# Patient Record
Sex: FEMALE | Race: WHITE | NOT HISPANIC OR LATINO | Employment: OTHER | URBAN - METROPOLITAN AREA
[De-identification: names, ages, dates, MRNs, and addresses within clinical notes are randomized per-mention and may not be internally consistent; named-entity substitution may affect disease eponyms.]

---

## 2024-02-05 ENCOUNTER — OFFICE VISIT (OUTPATIENT)
Dept: GASTROENTEROLOGY | Facility: CLINIC | Age: 64
End: 2024-02-05
Payer: MEDICARE

## 2024-02-05 ENCOUNTER — TELEPHONE (OUTPATIENT)
Dept: GASTROENTEROLOGY | Facility: CLINIC | Age: 64
End: 2024-02-05

## 2024-02-05 VITALS
WEIGHT: 186 LBS | SYSTOLIC BLOOD PRESSURE: 120 MMHG | BODY MASS INDEX: 35.12 KG/M2 | DIASTOLIC BLOOD PRESSURE: 80 MMHG | HEIGHT: 61 IN

## 2024-02-05 DIAGNOSIS — K42.9 PERIUMBILICAL HERNIA: ICD-10-CM

## 2024-02-05 DIAGNOSIS — Z12.11 COLON CANCER SCREENING: ICD-10-CM

## 2024-02-05 DIAGNOSIS — R19.4 CHANGE IN BOWEL HABIT: Primary | ICD-10-CM

## 2024-02-05 PROCEDURE — 99204 OFFICE O/P NEW MOD 45 MIN: CPT | Performed by: STUDENT IN AN ORGANIZED HEALTH CARE EDUCATION/TRAINING PROGRAM

## 2024-02-05 RX ORDER — POLYETHYLENE GLYCOL 3350 17 G/17G
238 POWDER, FOR SOLUTION ORAL ONCE
Qty: 238 G | Refills: 0 | Status: SHIPPED | OUTPATIENT
Start: 2024-02-05 | End: 2024-02-07 | Stop reason: HOSPADM

## 2024-02-05 RX ORDER — MULTIVITAMIN
1 CAPSULE ORAL DAILY
COMMUNITY

## 2024-02-05 NOTE — PROGRESS NOTES
Consultation - [facility]     Yulissa Sunshine 64 y.o. female MRN: [id number]  Unit/Bed#:  Encounter: [id number]    Consults    ASSESSMENT and PLAN    1. Change in bowel habit  She has new onset constipation which is improved somewhat with MiraLAX and senna.  She was advised to take MiraLAX every day, 1-2 times daily and take senna as needed.  Will plan for colonoscopy for acute change in bowel patterns and since her last was 5 years ago and she was advised to repeat around this time anyway.  Can assess for any structural/mucosal issues at that time.  Possible IBS component.  - Colonoscopy; Future  - polyethylene glycol (MiraLax) 17 GM/SCOOP powder; Take 238 g by mouth once for 1 dose Take 238 g my mouth. Use as directed  Dispense: 238 g; Refill: 0    2. Colon cancer screening  As noted above, she is due for colonoscopy this year so we will schedule at this time, MiraLAX prep, explained risk/benefits/alternatives, need for split dose bowel prep, need for clear liquid diet, ride to and from procedure.  - Colonoscopy; Future  - polyethylene glycol (MiraLax) 17 GM/SCOOP powder; Take 238 g by mouth once for 1 dose Take 238 g my mouth. Use as directed  Dispense: 238 g; Refill: 0    3. Periumbilical hernia  Incidental finding of Taylor's hernia on imaging.  She was given surgical referral by her gyn-onc, but is asking if we know anybody up here.  I advised we can refer to general surgery but she is wondering if she should see somebody with oncology specialty.  I advise she can discuss with her gyn-onc and see what they recommend in this regard.      Chief Complaint   Patient presents with    CT showing hernia        Physician Requesting Consult: No att. providers found    HPI  Yulissa Sunshine is a 64 y.o. 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  At baseline she moves her bowels once daily but for the last 1 to 2 months she has had worsening constipation, moving her bowels every few days and has associated bloating, indigestion and stomach \"rawness\".  She does have times where she goes to use the bathroom and nothing but air comes out and when stool does come out it is mushy/grainy.    She does not take prescription medications on a regular basis.  She did not receive radiation therapy for her cancer.  Her last colonoscopy was roughly 5 years ago and was advised to have a repeat in 5 years.    She did recently start MiraLAX and senna daily for about 3 days and had a \"decent \"bowel movement yesterday.  She did take half bottle of magnesium citrate sometime last week which improved her bowels and took away her bloating.    She had a recent CT at [place] which showed fecal contents in the colon, 2 images were attached to the chart and reviewed, no significant colonic distention.  She was also told she had a small Taylor's hernia which does cause her little bit of discomfort and was given surgery referral by her gyn-onc.      GI History:  Prior Colonoscopy: No prior colonoscopy  Prior EGD:  No prior EGD  Family hx:  No reported first degree relatives with colorectal cancer  Surgical hx: No prior abdominal surgeries  Blood thinners: Denies antiplatelet or anticoagulation use  NSAID use: Denies regular NSAID use  DM meds: None  Social Hx: No regular ETOH, smoking, or drug use.          Historical Information   No past medical history on file.  No past surgical history on file.  Social History   Social History     Substance and Sexual Activity   Alcohol Use Not on file     Social History     Substance and Sexual Activity   Drug Use Not on file     Social History     Tobacco Use   Smoking Status Not on file   Smokeless Tobacco Not on file     No family history on file.    Meds/Allergies     No current facility-administered medications for this visit.     (Not " "in a hospital admission)      Allergies   Allergen Reactions    Percocet [Oxycodone-Acetaminophen] Other (See Comments)     Disorientation and lightheaded       PHYSICAL EXAM    Visit Vitals  /80 (BP Location: Left arm, Patient Position: Sitting)   Ht 5' 1\" (1.549 m)   Wt 84.4 kg (186 lb)   BMI 35.14 kg/m²   BSA 1.83 m²     Body mass index is 35.14 kg/m².  General Appearance: NAD, cooperative, alert  Eyes: Anicteric, EOMI  ENT: Normocephalic, atraumatic, normal mucosa  Neck: symmetrical, trachea midline  Lungs: clear to auscultation, non-labored respirations on room air, no wheezing  CV: S1 S2+ radial pulses bilaterally  GI:  Soft, non-tender, non-distended; normal bowel sounds; no masses, no organomegaly   Rectal: Deferred  Musculoskeletal: No gross deformities or pitting edema  Skin:  No jaundice, no rashes  Neurologic: awake, alert, oriented, no gross deficits    No results found for: \"GLUCOSE\", \"CALCIUM\", \"NA\", \"K\", \"CO2\", \"CL\", \"BUN\", \"CREATININE\"  No results found for: \"WBC\", \"HGB\", \"MCV\", \"PLT\"  No results found for: \"ALT\", \"AST\", \"GGT\", \"ALKPHOS\", \"TBILI\"  No results found for: \"AMYLASE\"  No results found for: \"LIPASE\"  No results found for: \"IRON\", \"TIBC\", \"FERRITIN\"  No results found for: \"INR\"    CTA abdomen pelvis w wo contrast    Result Date: 1/29/2024  Narrative: STUDY: ENHANCED CT OF THE ABDOMEN AND PELVIS CLINICAL INFORMATION:   History: Patient with history of 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 PROCEDURE: Enhanced CT examination of the abdomen and pelvis was performed after administration of intravenous contrast.   Intravenous contrast: 80 mL of IOPAMIDOL 76 % IV SOLN   Enteric contrast: None. COMPARISON: CT abdomen/pelvis dated 11/8/2021. FINDINGS: LOWER THORAX: A separate chest CT has been performed and will be independently reported. LIVER: Normal in " size and configuration. No suspicious mass. Subcentimeter hypodensities too small to characterize but statistically benign. The portal veins are patent.      BILE DUCTS: No intrahepatic or extrahepatic bile duct dilation. GALLBLADDER: No calcified stones. Normal wall thickness. PANCREAS: Unremarkable. No main pancreatic duct dilation. SPLEEN: Unremarkable. Splenule. ADRENAL GLANDS: Unremarkable. KIDNEYS/URETERS: Persistent fetal lobulation of the kidneys. No hydroureteronephrosis. No suspicious renal mass. 3 mm nonobstructing right upper pole renal calculus, similar to prior. Unchanged subcentimeter left interpolar cyst measuring slightly above water attenuation (series 13, image 74). Bilateral simple renal cysts. Additional hypodensities are too small to characterize but statistically benign. BLADDER: Normally distended.  REPRODUCTIVE ORGANS: Hysterectomy. No adnexal mass. BOWEL: Small hiatal hernia. No disproportionate dilation of the small or large bowel. The appendix is unremarkable. There are scattered colonic diverticula without findings of diverticulitis. Large colonic stool burden in keeping with hypomotility. Debris within the distal small bowel which may be on the basis of delayed transit as opposed to an incompetent ileocecal valve. PERITONEUM/RETROPERITONEUM: No fluid collection, ascites, or pneumoperitoneum. LYMPH NODES: No abdominal or pelvic lymphadenopathy. VESSELS: Atherosclerotic disease without aortic aneurysm [ABAN00]. ABDOMINAL/PELVIC WALL: Supraumbilical ventral Taylor's hernia containing nonobstructed small bowel loops. Postsurgical changes of anterior abdominal wall. BONES: No suspicious osseous lesions.  Degenerative changes of the spine. Grade 1 anterolisthesis of L4 and L5, similar to prior. Scattered pelvic bone islands. START INTERVAL TUMOR RESPONSE ASSESSMENT Pre-existing lesions: No previously documented tumor(s). [ORAP00] New lesions: No new tumor identified since prior study.  [ORAN1] END INTERVAL TUMOR RESPONSE ASSESSMENT    Impression: 1.  No acute intra-abdominal pathology or findings in the bowel to account for patient symptomatology. 2.  No evidence of locally recurrent or metastatic disease in the abdomen or pelvis. ATTENDING PHYSICIAN AGREEMENT [[id number]]: I have personally reviewed the images and agree with this report.    CT chest w contrast    Result Date: 1/27/2024  Narrative: CLINICAL INFORMATION:  Ovarian cancer PROCEDURE: Contrast enhanced CT of the chest was performed using thin section reconstructions. INTRAVENOUS CONTRAST: 80ml of IOPAMIDOL 76 % IV SOLN. COMPARISON: 11/21 FINDINGS: LUNGS, PLEURA: Curvilinear bibasilar scarring. 4 mm right middle lobe nodule, unchanged CARDIOVASCULAR, MEDIASTINUM, THYROID: Small hiatal hernia. Coronary calcifications. LYMPH NODES: No pathological adenopathy SKELETON, CHEST WALL: No destructive bone lesion  UPPER ABDOMEN: Reported separately    Impression: No new or growing nodules      Imaging Studies: I have personally reviewed pertinent reports.      Pathology, and Other Studies: I have personally reviewed pertinent reports.      REVIEW OF SYSTEMS    CONSTITUTIONAL: negative unless stated in HPI  GASTROINTESTINAL: As noted in the HPI

## 2024-02-05 NOTE — H&P (VIEW-ONLY)
Consultation - [facility]     Yulissa Sunshine 64 y.o. female MRN: [id number]  Unit/Bed#:  Encounter: [id number]    Consults    ASSESSMENT and PLAN    1. Change in bowel habit  She has new onset constipation which is improved somewhat with MiraLAX and senna.  She was advised to take MiraLAX every day, 1-2 times daily and take senna as needed.  Will plan for colonoscopy for acute change in bowel patterns and since her last was 5 years ago and she was advised to repeat around this time anyway.  Can assess for any structural/mucosal issues at that time.  Possible IBS component.  - Colonoscopy; Future  - polyethylene glycol (MiraLax) 17 GM/SCOOP powder; Take 238 g by mouth once for 1 dose Take 238 g my mouth. Use as directed  Dispense: 238 g; Refill: 0    2. Colon cancer screening  As noted above, she is due for colonoscopy this year so we will schedule at this time, MiraLAX prep, explained risk/benefits/alternatives, need for split dose bowel prep, need for clear liquid diet, ride to and from procedure.  - Colonoscopy; Future  - polyethylene glycol (MiraLax) 17 GM/SCOOP powder; Take 238 g by mouth once for 1 dose Take 238 g my mouth. Use as directed  Dispense: 238 g; Refill: 0    3. Periumbilical hernia  Incidental finding of Taylor's hernia on imaging.  She was given surgical referral by her gyn-onc, but is asking if we know anybody up here.  I advised we can refer to general surgery but she is wondering if she should see somebody with oncology specialty.  I advise she can discuss with her gyn-onc and see what they recommend in this regard.      Chief Complaint   Patient presents with    CT showing hernia        Physician Requesting Consult: No att. providers found    HPI  Yulissa Sunshine is a 64 y.o. 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  At baseline she moves her bowels once daily but for the last 1 to 2 months she has had worsening constipation, moving her bowels every few days and has associated bloating, indigestion and stomach \"rawness\".  She does have times where she goes to use the bathroom and nothing but air comes out and when stool does come out it is mushy/grainy.    She does not take prescription medications on a regular basis.  She did not receive radiation therapy for her cancer.  Her last colonoscopy was roughly 5 years ago and was advised to have a repeat in 5 years.    She did recently start MiraLAX and senna daily for about 3 days and had a \"decent \"bowel movement yesterday.  She did take half bottle of magnesium citrate sometime last week which improved her bowels and took away her bloating.    She had a recent CT at [place] which showed fecal contents in the colon, 2 images were attached to the chart and reviewed, no significant colonic distention.  She was also told she had a small Taylor's hernia which does cause her little bit of discomfort and was given surgery referral by her gyn-onc.      GI History:  Prior Colonoscopy: No prior colonoscopy  Prior EGD:  No prior EGD  Family hx:  No reported first degree relatives with colorectal cancer  Surgical hx: No prior abdominal surgeries  Blood thinners: Denies antiplatelet or anticoagulation use  NSAID use: Denies regular NSAID use  DM meds: None  Social Hx: No regular ETOH, smoking, or drug use.          Historical Information   No past medical history on file.  No past surgical history on file.  Social History   Social History     Substance and Sexual Activity   Alcohol Use Not on file     Social History     Substance and Sexual Activity   Drug Use Not on file     Social History     Tobacco Use   Smoking Status Not on file   Smokeless Tobacco Not on file     No family history on file.    Meds/Allergies     No current facility-administered medications for this visit.     (Not " "in a hospital admission)      Allergies   Allergen Reactions    Percocet [Oxycodone-Acetaminophen] Other (See Comments)     Disorientation and lightheaded       PHYSICAL EXAM    Visit Vitals  /80 (BP Location: Left arm, Patient Position: Sitting)   Ht 5' 1\" (1.549 m)   Wt 84.4 kg (186 lb)   BMI 35.14 kg/m²   BSA 1.83 m²     Body mass index is 35.14 kg/m².  General Appearance: NAD, cooperative, alert  Eyes: Anicteric, EOMI  ENT: Normocephalic, atraumatic, normal mucosa  Neck: symmetrical, trachea midline  Lungs: clear to auscultation, non-labored respirations on room air, no wheezing  CV: S1 S2+ radial pulses bilaterally  GI:  Soft, non-tender, non-distended; normal bowel sounds; no masses, no organomegaly   Rectal: Deferred  Musculoskeletal: No gross deformities or pitting edema  Skin:  No jaundice, no rashes  Neurologic: awake, alert, oriented, no gross deficits    No results found for: \"GLUCOSE\", \"CALCIUM\", \"NA\", \"K\", \"CO2\", \"CL\", \"BUN\", \"CREATININE\"  No results found for: \"WBC\", \"HGB\", \"MCV\", \"PLT\"  No results found for: \"ALT\", \"AST\", \"GGT\", \"ALKPHOS\", \"TBILI\"  No results found for: \"AMYLASE\"  No results found for: \"LIPASE\"  No results found for: \"IRON\", \"TIBC\", \"FERRITIN\"  No results found for: \"INR\"    CTA abdomen pelvis w wo contrast    Result Date: 1/29/2024  Narrative: STUDY: ENHANCED CT OF THE ABDOMEN AND PELVIS CLINICAL INFORMATION:   History: Patient with history of 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 PROCEDURE: Enhanced CT examination of the abdomen and pelvis was performed after administration of intravenous contrast.   Intravenous contrast: 80 mL of IOPAMIDOL 76 % IV SOLN   Enteric contrast: None. COMPARISON: CT abdomen/pelvis dated 11/8/2021. FINDINGS: LOWER THORAX: A separate chest CT has been performed and will be independently reported. LIVER: Normal in " size and configuration. No suspicious mass. Subcentimeter hypodensities too small to characterize but statistically benign. The portal veins are patent.      BILE DUCTS: No intrahepatic or extrahepatic bile duct dilation. GALLBLADDER: No calcified stones. Normal wall thickness. PANCREAS: Unremarkable. No main pancreatic duct dilation. SPLEEN: Unremarkable. Splenule. ADRENAL GLANDS: Unremarkable. KIDNEYS/URETERS: Persistent fetal lobulation of the kidneys. No hydroureteronephrosis. No suspicious renal mass. 3 mm nonobstructing right upper pole renal calculus, similar to prior. Unchanged subcentimeter left interpolar cyst measuring slightly above water attenuation (series 13, image 74). Bilateral simple renal cysts. Additional hypodensities are too small to characterize but statistically benign. BLADDER: Normally distended.  REPRODUCTIVE ORGANS: Hysterectomy. No adnexal mass. BOWEL: Small hiatal hernia. No disproportionate dilation of the small or large bowel. The appendix is unremarkable. There are scattered colonic diverticula without findings of diverticulitis. Large colonic stool burden in keeping with hypomotility. Debris within the distal small bowel which may be on the basis of delayed transit as opposed to an incompetent ileocecal valve. PERITONEUM/RETROPERITONEUM: No fluid collection, ascites, or pneumoperitoneum. LYMPH NODES: No abdominal or pelvic lymphadenopathy. VESSELS: Atherosclerotic disease without aortic aneurysm [ABAN00]. ABDOMINAL/PELVIC WALL: Supraumbilical ventral Taylor's hernia containing nonobstructed small bowel loops. Postsurgical changes of anterior abdominal wall. BONES: No suspicious osseous lesions.  Degenerative changes of the spine. Grade 1 anterolisthesis of L4 and L5, similar to prior. Scattered pelvic bone islands. START INTERVAL TUMOR RESPONSE ASSESSMENT Pre-existing lesions: No previously documented tumor(s). [ORAP00] New lesions: No new tumor identified since prior study.  [ORAN1] END INTERVAL TUMOR RESPONSE ASSESSMENT    Impression: 1.  No acute intra-abdominal pathology or findings in the bowel to account for patient symptomatology. 2.  No evidence of locally recurrent or metastatic disease in the abdomen or pelvis. ATTENDING PHYSICIAN AGREEMENT [[id number]]: I have personally reviewed the images and agree with this report.    CT chest w contrast    Result Date: 1/27/2024  Narrative: CLINICAL INFORMATION:  Ovarian cancer PROCEDURE: Contrast enhanced CT of the chest was performed using thin section reconstructions. INTRAVENOUS CONTRAST: 80ml of IOPAMIDOL 76 % IV SOLN. COMPARISON: 11/21 FINDINGS: LUNGS, PLEURA: Curvilinear bibasilar scarring. 4 mm right middle lobe nodule, unchanged CARDIOVASCULAR, MEDIASTINUM, THYROID: Small hiatal hernia. Coronary calcifications. LYMPH NODES: No pathological adenopathy SKELETON, CHEST WALL: No destructive bone lesion  UPPER ABDOMEN: Reported separately    Impression: No new or growing nodules      Imaging Studies: I have personally reviewed pertinent reports.      Pathology, and Other Studies: I have personally reviewed pertinent reports.      REVIEW OF SYSTEMS    CONSTITUTIONAL: negative unless stated in HPI  GASTROINTESTINAL: As noted in the HPI

## 2024-02-05 NOTE — TELEPHONE ENCOUNTER
Scheduled date of colonoscopy (as of today): 2/7/2024  Physician performing colonoscopy: MASOUD  Location of colonoscopy: [facility]  Bowel prep reviewed with patient: /Brett  Instructions reviewed with patient by: MELISSA  Clearances: N

## 2024-02-05 NOTE — PATIENT INSTRUCTIONS
We will plan for colonoscopy. You will need to be on a clear liquid diet the day before your procedure and have nothing to eat/drink after midnight except your bowel prep.  Please take your bowel prep the evening before your procedure as instructed. You should get a phone call the week leading up to your procedure to go over these instructions.  You will need a ride to and from the procedure unit on the day of your procedure. Please do not drive for the rest of the day after your procedure.

## 2024-02-07 ENCOUNTER — HOSPITAL ENCOUNTER (OUTPATIENT)
Dept: GASTROENTEROLOGY | Facility: AMBULATORY SURGERY CENTER | Age: 64
Discharge: HOME | End: 2024-02-07
Attending: STUDENT IN AN ORGANIZED HEALTH CARE EDUCATION/TRAINING PROGRAM
Payer: MEDICARE

## 2024-02-07 ENCOUNTER — ANESTHESIA EVENT (OUTPATIENT)
Dept: GASTROENTEROLOGY | Facility: AMBULATORY SURGERY CENTER | Age: 64
End: 2024-02-07

## 2024-02-07 ENCOUNTER — ANESTHESIA (OUTPATIENT)
Dept: GASTROENTEROLOGY | Facility: AMBULATORY SURGERY CENTER | Age: 64
End: 2024-02-07

## 2024-02-07 VITALS
SYSTOLIC BLOOD PRESSURE: 139 MMHG | BODY MASS INDEX: 35.12 KG/M2 | TEMPERATURE: 98 F | RESPIRATION RATE: 20 BRPM | OXYGEN SATURATION: 97 % | DIASTOLIC BLOOD PRESSURE: 67 MMHG | WEIGHT: 186 LBS | HEART RATE: 64 BPM | HEIGHT: 61 IN

## 2024-02-07 DIAGNOSIS — R19.4 CHANGE IN BOWEL HABIT: ICD-10-CM

## 2024-02-07 DIAGNOSIS — Z12.11 COLON CANCER SCREENING: ICD-10-CM

## 2024-02-07 PROCEDURE — 45378 DIAGNOSTIC COLONOSCOPY: CPT | Performed by: INTERNAL MEDICINE

## 2024-02-07 RX ORDER — ONDANSETRON 2 MG/ML
4 INJECTION INTRAMUSCULAR; INTRAVENOUS ONCE AS NEEDED
Status: DISCONTINUED | OUTPATIENT
Start: 2024-02-07 | End: 2024-02-11 | Stop reason: HOSPADM

## 2024-02-07 RX ORDER — SODIUM CHLORIDE, SODIUM LACTATE, POTASSIUM CHLORIDE, CALCIUM CHLORIDE 600; 310; 30; 20 MG/100ML; MG/100ML; MG/100ML; MG/100ML
50 INJECTION, SOLUTION INTRAVENOUS CONTINUOUS
Status: DISCONTINUED | OUTPATIENT
Start: 2024-02-07 | End: 2024-02-11 | Stop reason: HOSPADM

## 2024-02-07 RX ORDER — SODIUM CHLORIDE, SODIUM LACTATE, POTASSIUM CHLORIDE, CALCIUM CHLORIDE 600; 310; 30; 20 MG/100ML; MG/100ML; MG/100ML; MG/100ML
INJECTION, SOLUTION INTRAVENOUS CONTINUOUS PRN
Status: DISCONTINUED | OUTPATIENT
Start: 2024-02-07 | End: 2024-02-07

## 2024-02-07 RX ORDER — SODIUM CHLORIDE, SODIUM LACTATE, POTASSIUM CHLORIDE, CALCIUM CHLORIDE 600; 310; 30; 20 MG/100ML; MG/100ML; MG/100ML; MG/100ML
20 INJECTION, SOLUTION INTRAVENOUS CONTINUOUS
Status: DISCONTINUED | OUTPATIENT
Start: 2024-02-07 | End: 2024-02-11 | Stop reason: HOSPADM

## 2024-02-07 RX ORDER — PROPOFOL 10 MG/ML
INJECTION, EMULSION INTRAVENOUS AS NEEDED
Status: DISCONTINUED | OUTPATIENT
Start: 2024-02-07 | End: 2024-02-07

## 2024-02-07 RX ADMIN — PROPOFOL 50 MG: 10 INJECTION, EMULSION INTRAVENOUS at 15:30

## 2024-02-07 RX ADMIN — PROPOFOL 50 MG: 10 INJECTION, EMULSION INTRAVENOUS at 15:32

## 2024-02-07 RX ADMIN — PROPOFOL 50 MG: 10 INJECTION, EMULSION INTRAVENOUS at 15:34

## 2024-02-07 RX ADMIN — SODIUM CHLORIDE, SODIUM LACTATE, POTASSIUM CHLORIDE, CALCIUM CHLORIDE 50 ML/HR: 600; 310; 30; 20 INJECTION, SOLUTION INTRAVENOUS at 15:22

## 2024-02-07 RX ADMIN — PROPOFOL 100 MG: 10 INJECTION, EMULSION INTRAVENOUS at 15:28

## 2024-02-07 RX ADMIN — SODIUM CHLORIDE, SODIUM LACTATE, POTASSIUM CHLORIDE, CALCIUM CHLORIDE: 600; 310; 30; 20 INJECTION, SOLUTION INTRAVENOUS at 14:47

## 2024-02-07 RX ADMIN — PROPOFOL 50 MG: 10 INJECTION, EMULSION INTRAVENOUS at 15:38

## 2024-02-07 NOTE — ANESTHESIA PREPROCEDURE EVALUATION
Procedure:  COLONOSCOPY    Relevant Problems   No relevant active problems    Class II obesity    Physical Exam    Airway       Dental       Cardiovascular      Pulmonary      Other Findings  post-pubertal.      Anesthesia Plan  ASA Score- 2     Anesthesia Type- IV sedation with anesthesia with ASA Monitors.         Additional Monitors:     Airway Plan:     Comment: Last of PO bowel prep: 10:15    Patient educated on the possibility for awareness under sedation and of the possibility of airway intervention in the event of an airway or procedural emergency  .       Plan Factors-Exercise tolerance (METS): >4 METS.    Chart reviewed.    Patient summary reviewed.    Patient is not a current smoker.              Induction- intravenous.    Postoperative Plan-     Informed Consent- Anesthetic plan and risks discussed with patient.  I personally reviewed this patient with the CRNA. Discussed and agreed on the Anesthesia Plan with the CRNA..

## 2024-02-07 NOTE — ANESTHESIA POSTPROCEDURE EVALUATION
Post-Op Assessment Note    CV Status:  Stable  Pain Score: 0    Pain management: adequate       Mental Status:  Sleepy   PONV Controlled:  None   Airway Patency:  Patent     Post Op Vitals Reviewed: Yes    No anethesia notable event occurred.    Staff: CRNA               BP   116/62   Temp      Pulse  68   Resp   15   SpO2   999